# Patient Record
Sex: MALE | Race: BLACK OR AFRICAN AMERICAN | NOT HISPANIC OR LATINO | Employment: OTHER | URBAN - METROPOLITAN AREA
[De-identification: names, ages, dates, MRNs, and addresses within clinical notes are randomized per-mention and may not be internally consistent; named-entity substitution may affect disease eponyms.]

---

## 2017-01-01 ENCOUNTER — LAB VISIT (OUTPATIENT)
Dept: LAB | Facility: HOSPITAL | Age: 77
End: 2017-01-01
Payer: MEDICARE

## 2017-01-01 ENCOUNTER — HISTORICAL (OUTPATIENT)
Dept: LAB | Facility: HOSPITAL | Age: 77
End: 2017-01-01

## 2017-01-01 ENCOUNTER — LAB VISIT (OUTPATIENT)
Dept: LAB | Facility: HOSPITAL | Age: 77
End: 2017-01-01
Attending: INTERNAL MEDICINE
Payer: MEDICARE

## 2017-01-01 ENCOUNTER — HISTORICAL (OUTPATIENT)
Dept: ADMINISTRATIVE | Facility: HOSPITAL | Age: 77
End: 2017-01-01

## 2017-01-01 ENCOUNTER — DOCUMENTATION ONLY (OUTPATIENT)
Dept: TRANSPLANT | Facility: CLINIC | Age: 77
End: 2017-01-01

## 2017-01-01 ENCOUNTER — TELEPHONE (OUTPATIENT)
Dept: TRANSPLANT | Facility: CLINIC | Age: 77
End: 2017-01-01

## 2017-01-01 ENCOUNTER — OFFICE VISIT (OUTPATIENT)
Dept: TRANSPLANT | Facility: CLINIC | Age: 77
End: 2017-01-01
Payer: MEDICARE

## 2017-01-01 ENCOUNTER — HISTORICAL (OUTPATIENT)
Dept: RADIOLOGY | Facility: HOSPITAL | Age: 77
End: 2017-01-01

## 2017-01-01 VITALS
DIASTOLIC BLOOD PRESSURE: 66 MMHG | BODY MASS INDEX: 30.18 KG/M2 | SYSTOLIC BLOOD PRESSURE: 128 MMHG | OXYGEN SATURATION: 97 % | WEIGHT: 187.81 LBS | HEIGHT: 66 IN | RESPIRATION RATE: 16 BRPM | TEMPERATURE: 98 F | HEART RATE: 72 BPM

## 2017-01-01 DIAGNOSIS — Z76.82 ORGAN TRANSPLANT CANDIDATE: ICD-10-CM

## 2017-01-01 DIAGNOSIS — I10 ESSENTIAL HYPERTENSION: Chronic | ICD-10-CM

## 2017-01-01 DIAGNOSIS — K21.9 GASTROESOPHAGEAL REFLUX DISEASE, ESOPHAGITIS PRESENCE NOT SPECIFIED: ICD-10-CM

## 2017-01-01 DIAGNOSIS — Z76.82 ORGAN TRANSPLANT CANDIDATE: Primary | ICD-10-CM

## 2017-01-01 DIAGNOSIS — E11.22 CONTROLLED TYPE 2 DIABETES MELLITUS WITH CHRONIC KIDNEY DISEASE, WITHOUT LONG-TERM CURRENT USE OF INSULIN, UNSPECIFIED CKD STAGE: ICD-10-CM

## 2017-01-01 DIAGNOSIS — M10.9 GOUT, ARTHRITIS: ICD-10-CM

## 2017-01-01 DIAGNOSIS — G47.30 SLEEP APNEA, UNSPECIFIED TYPE: ICD-10-CM

## 2017-01-01 DIAGNOSIS — Z76.82 AWAITING ORGAN TRANSPLANT STATUS: ICD-10-CM

## 2017-01-01 DIAGNOSIS — C64.9 RENAL CELL CANCER, UNSPECIFIED LATERALITY: Chronic | ICD-10-CM

## 2017-01-01 DIAGNOSIS — Z85.528 HISTORY OF RENAL CELL CARCINOMA: Chronic | ICD-10-CM

## 2017-01-01 DIAGNOSIS — N25.81 SECONDARY HYPERPARATHYROIDISM, RENAL: ICD-10-CM

## 2017-01-01 DIAGNOSIS — N18.6 ESRD (END STAGE RENAL DISEASE): Primary | Chronic | ICD-10-CM

## 2017-01-01 LAB
ABS NEUT (OLG): 5.3 X10(3)/MCL (ref 2.1–9.2)
ALBUMIN SERPL-MCNC: 3.3 GM/DL (ref 3.4–5)
ALBUMIN/GLOB SERPL: 1 RATIO (ref 1.1–2)
ALP SERPL-CCNC: 130 UNIT/L (ref 46–116)
ALT SERPL-CCNC: 19 UNIT/L (ref 12–78)
AST SERPL-CCNC: 16 UNIT/L (ref 15–37)
BASOPHILS NFR BLD AUTO: 0 % (ref 0–2)
BILIRUB SERPL-MCNC: 0.6 MG/DL (ref 0.2–1)
BILIRUBIN DIRECT+TOT PNL SERPL-MCNC: 0.13 MG/DL (ref 0–0.2)
BILIRUBIN DIRECT+TOT PNL SERPL-MCNC: 0.47 MG/DL (ref 0–0.8)
BUN SERPL-MCNC: 29 MG/DL (ref 7–18)
CALCIUM SERPL-MCNC: 9.3 MG/DL (ref 8.5–10.1)
CHLORIDE SERPL-SCNC: 101 MMOL/L (ref 98–107)
CLASS I ANTIBODIES - LUMINEX: NEGATIVE
CLASS II ANTIBODIES - LUMINEX: NEGATIVE
CO2 SERPL-SCNC: 29.6 MMOL/L (ref 21–32)
CPRA %: 0
CREAT SERPL-MCNC: 6.42 MG/DL (ref 0.6–1.3)
EOSINOPHIL # BLD AUTO: 0.1 X10(3)/MCL
EOSINOPHIL NFR BLD AUTO: 1 %
ERYTHROCYTE [DISTWIDTH] IN BLOOD BY AUTOMATED COUNT: 13.8 % (ref 11.5–17)
ERYTHROCYTE [DISTWIDTH] IN BLOOD BY AUTOMATED COUNT: 14.1 % (ref 11.5–17)
GLOBULIN SER-MCNC: 3.2 GM/DL (ref 2.4–3.5)
GLUCOSE SERPL-MCNC: 243 MG/DL (ref 74–106)
HCT VFR BLD AUTO: 33.3 % (ref 42–52)
HCT VFR BLD AUTO: 33.6 % (ref 42–52)
HEP. B SURF AB, QUAL: POSITIVE
HEP. B SURF AB, QUANT.: 113 MIU/ML
HGB BLD-MCNC: 11.4 GM/DL (ref 14–18)
HGB BLD-MCNC: 11.5 GM/DL (ref 14–18)
HPRA INTERPRETATION: NORMAL
LYMPHOCYTES # BLD AUTO: 1.2 X10(3)/MCL
LYMPHOCYTES NFR BLD AUTO: 16 % (ref 13–40)
MAGNESIUM SERPL-MCNC: 1.9 MG/DL (ref 1.8–2.4)
MCH RBC QN AUTO: 36.2 PG (ref 27–31)
MCH RBC QN AUTO: 37.4 PG (ref 27–31)
MCHC RBC AUTO-ENTMCNC: 34.2 GM/DL (ref 33–36)
MCHC RBC AUTO-ENTMCNC: 34.3 GM/DL (ref 33–36)
MCV RBC AUTO: 106 FL (ref 80–94)
MCV RBC AUTO: 109.1 FL (ref 80–94)
MONOCYTES # BLD AUTO: 0.8 X10(3)/MCL
MONOCYTES NFR BLD AUTO: 11 % (ref 2–11)
NEUTROPHILS # BLD AUTO: 5.3 X10(3)/MCL (ref 2.1–9.2)
NEUTROPHILS NFR BLD AUTO: 72 % (ref 47–80)
PLATELET # BLD AUTO: 217 X10(3)/MCL (ref 130–400)
PLATELET # BLD AUTO: 244 X10(3)/MCL (ref 130–400)
PMV BLD AUTO: 7.6 FL (ref 7.4–10.4)
PMV BLD AUTO: 7.7 FL (ref 7.4–10.4)
POTASSIUM SERPL-SCNC: 3.3 MMOL/L (ref 3.5–5.1)
POTASSIUM SERPL-SCNC: 3.8 MMOL/L (ref 3.5–5.1)
PROT SERPL-MCNC: 6.5 GM/DL (ref 6.4–8.2)
RBC # BLD AUTO: 3.05 X10(6)/MCL (ref 4.7–6.1)
RBC # BLD AUTO: 3.17 X10(6)/MCL (ref 4.7–6.1)
SERUM COLLECTION DT - LUMINEX CLASS I: NORMAL
SERUM COLLECTION DT - LUMINEX CLASS II: NORMAL
SODIUM SERPL-SCNC: 140 MMOL/L (ref 136–145)
SPCL1 TESTING DATE: NORMAL
SPCL2 TESTING DATE: NORMAL
SPCLU TESTING DATE: NORMAL
WBC # SPEC AUTO: 4.8 X10(3)/MCL (ref 4.5–11.5)
WBC # SPEC AUTO: 7.4 X10(3)/MCL (ref 4.5–11.5)

## 2017-01-01 PROCEDURE — 86829 HLA CLASS I/II ANTIBODY QUAL: CPT | Mod: 91,PO,TXP

## 2017-01-01 PROCEDURE — 86829 HLA CLASS I/II ANTIBODY QUAL: CPT | Mod: PO,TXP

## 2017-01-01 PROCEDURE — 86832 HLA CLASS I HIGH DEFIN QUAL: CPT | Mod: PO,TXP

## 2017-01-01 PROCEDURE — 86829 HLA CLASS I/II ANTIBODY QUAL: CPT | Mod: PO

## 2017-01-01 PROCEDURE — 99999 PR PBB SHADOW E&M-EST. PATIENT-LVL III: CPT | Mod: PBBFAC,TXP,, | Performed by: INTERNAL MEDICINE

## 2017-01-01 PROCEDURE — 86833 HLA CLASS II HIGH DEFIN QUAL: CPT | Mod: PO,TXP

## 2017-01-01 PROCEDURE — 86829 HLA CLASS I/II ANTIBODY QUAL: CPT | Mod: 91,PO

## 2017-01-01 PROCEDURE — 99213 OFFICE O/P EST LOW 20 MIN: CPT | Mod: PBBFAC,TXP | Performed by: INTERNAL MEDICINE

## 2017-01-01 PROCEDURE — 99215 OFFICE O/P EST HI 40 MIN: CPT | Mod: S$PBB,TXP,, | Performed by: INTERNAL MEDICINE

## 2017-01-11 ENCOUNTER — TELEPHONE (OUTPATIENT)
Dept: RADIOLOGY | Facility: HOSPITAL | Age: 77
End: 2017-01-11

## 2017-01-12 ENCOUNTER — HOSPITAL ENCOUNTER (OUTPATIENT)
Dept: RADIOLOGY | Facility: HOSPITAL | Age: 77
Discharge: HOME | End: 2017-01-12
Attending: TRANSPLANT SURGERY
Payer: MEDICARE

## 2017-01-12 DIAGNOSIS — Z76.82 AWAITING ORGAN TRANSPLANT STATUS: ICD-10-CM

## 2017-01-12 PROCEDURE — 74176 CT ABD & PELVIS W/O CONTRAST: CPT | Mod: TC,PO

## 2017-01-12 PROCEDURE — 74176 CT ABD & PELVIS W/O CONTRAST: CPT | Mod: 26,,, | Performed by: RADIOLOGY

## 2017-01-13 ENCOUNTER — LAB VISIT (OUTPATIENT)
Dept: LAB | Facility: HOSPITAL | Age: 77
End: 2017-01-13
Payer: MEDICARE

## 2017-01-13 DIAGNOSIS — Z76.82 ORGAN TRANSPLANT CANDIDATE: ICD-10-CM

## 2017-01-13 PROCEDURE — 86829 HLA CLASS I/II ANTIBODY QUAL: CPT | Mod: PO

## 2017-01-13 PROCEDURE — 86829 HLA CLASS I/II ANTIBODY QUAL: CPT | Mod: 91,PO

## 2017-01-20 ENCOUNTER — HISTORICAL (OUTPATIENT)
Dept: CARDIOLOGY | Facility: HOSPITAL | Age: 77
End: 2017-01-20

## 2017-02-18 NOTE — NURSING
ON-CALL NOTE    UNOS# [id number]    Notified by Khanh Correa, , that Edilson Black on list for cadaveric kidney.  Spoke with patient and identified no acute medical issues in telephone assessment.  Current sample of blood is available for crossmatch.  Patient reports no sensitizing event since last blood sample for PRA received.    Notified by Khanh Correa that virtual crossmatch is negative.  Patient notified of test results and verbalized understanding. Pt notified that transplant organ is PHS High Risk due to prison Time, pt accepts this Back-up offer. Donor OR time scheduled for 9:00am.  Pt on stand-by for further instructions.    Late entry:  2/18/17 at 1:15pm:  Received notification from Gee Correa that primary recipient will receive the kidney, release the back-up.  Spoke to pt's wife and released pt from stand-by.

## 2017-03-03 NOTE — PROGRESS NOTES
YEARLY LIST MANAGEMENT NOTE    Edilson Black's kidney transplant listing status reviewed.  Patient is due for follow-up appointments in April 2017.  Appointments will be scheduled per protocol.  HLA sample is current and being rec'd on a regular basis.

## 2017-04-25 NOTE — PROGRESS NOTES
Patient was seen in listed [facility] transplant clinic for continued evaluation for kidney, kidney/pancreas or pancreas only transplant. The patient attended a group education session that discussed/reviewed the following aspects of transplantation: evaluation and selection committee process, UNOS waitlist management/multiple listings, types of organs offered (KDPI < 85%, KDPI > 85%, PHS increased risk, DCD), financial aspects, surgical procedures, dietary instruction pre- and post-transplant, health maintenance pre- and post-transplant, post-transplant hospitalization and outpatient follow-up, potential to participate in a research protocol, and medication management and side effects. A question and answer session was provided after the presentation.     The patient was seen by all members of the multi-disciplinary team to include: Nephrologist/PA, , Transplant Coordinator, and .      The patient reviewed and signed all consents for evaluation which were witnessed and sent to scanning into the EPIC chart.     The patient was given an education book and plan for further evaluation based on her individual assessment.      The patient was encouraged to call with any questions or concerns.

## 2017-04-25 NOTE — MR AVS SNAPSHOT
[facility]  [address]  [facility] [address]  Phone: [phone]                  Edilson Black   2017 12:30 PM   Office Visit    Description:  Male : 1940   Provider:  KIDNEY LISTED, TRANSPLANT   Department:  [facility]           Reason for Visit     Chronic Kidney Disease     Waitlist Status Update           Diagnoses this Visit        Comments    ESRD (end stage renal disease)    -  Primary     Controlled type 2 diabetes mellitus with chronic kidney disease, without long-term current use of insulin, unspecified CKD stage         History of renal cell carcinoma         Gastroesophageal reflux disease, esophagitis presence not specified         Gout, arthritis         Essential hypertension         Renal cell cancer, unspecified laterality         Secondary hyperparathyroidism, renal         Sleep apnea, unspecified type         Awaiting organ transplant status                To Do List           Goals (5 Years of Data)     None      [facility]     [facility] -  Assistance  Unless otherwise directed by your provider, please contact [facility], our nurse care line that is available for  assistance.     Registered nurses in the [facility] Center provide: appointment scheduling, clinical advisement, health education, and other advisory services.  Call: [phone] (toll free)               Medications           Message regarding Medications     Verify the changes and/or additions to your medication regime listed below are the same as discussed with your clinician today.  If any of these changes or additions are incorrect, please notify your healthcare provider.        STOP taking these medications     aspirin (ECOTRIN) 81 MG EC tablet Take 81 mg by mouth once daily.           Verify that the below list of medications is an accurate representation of the medications you are currently taking.  If none reported, the list  "may be blank. If incorrect, please contact your healthcare provider. Carry this list with you in case of emergency.           Current Medications     eszopiclone (LUNESTA) 3 mg Tab Take 3 mg by mouth every evening.    febuxostat (ULORIC) 40 mg Tab Take 40 mg by mouth once daily.    fish oil-omega-3 fatty acids 300-1,000 mg capsule Take 1 g by mouth once daily.    pantoprazole (PROTONIX) 40 MG tablet Take 40 mg by mouth once daily.    pioglitazone (ACTOS) 30 MG tablet Take 30 mg by mouth once daily.    prednisoLONE acetate (PRED FORTE) 1 % DrpS 1 drop 4 (four) times daily.    sevelamer carbonate (RENVELA) 800 mg Tab Take 2,400 mg by mouth 3 (three) times daily with meals. And 1 tablet twice daily with snacks    valacyclovir (VALTREX) 500 MG tablet Take 500 mg by mouth once daily.    VITAMIN B COMP W-C/FA/ZINC (DIALYVITE 800 ORAL) Take 1 tablet by mouth once daily.           Clinical Reference Information           Your Vitals Were     BP Pulse Temp Resp Height Weight    128/66 (BP Location: Right arm, Patient Position: Sitting, BP Method: Automatic) 72 98.3 °F (36.8 °C) (Oral) 16 5' 6" (1.676 m) 85.2 kg (187 lb 13.3 oz)    SpO2 BMI             97% 30.32 kg/m2         Blood Pressure          Most Recent Value    BP  128/66      Allergies as of 4/25/2017     Iodine And Iodide Containing Products      Immunizations Administered on Date of Encounter - 4/25/2017     None      Maintenance Dialysis History     Start End Type Comments Center    10/26/2012  [place] [phone] [facility]            Current Dialysis Center Information     [facility] [address] Phone #:  [phone]    Contact:  N/A STEFANY BONE  [address] Fax #:  [phone]            Transplant Information        Txp Date Organ Coordinator Care Team     Kidney Avelino Keane Jr., RN Referring Physician:  Perez Ritchie MD   Current Nephrologist:  Perez Ritchie MD         [facility] Sign-Up     Activating your " [facility] account is as easy as 1-2-3!     1) Visit [email or web address], select Sign Up Now, enter this activation code and your date of birth, then select Next.  [email or web address]  Expires: 6/9/2017  4:14 PM      2) Create a username and password to use when you visit [facility] in the future and select a security question in case you lose your password and select Next.    3) Enter your e-mail address and click Sign Up!    Additional Information  If you have questions, please e-mail [email or web address] or call [phone] to talk to our [facility] staff. Remember, [facility] is NOT to be used for urgent needs. For medical emergencies, dial 911.         Instructions    1. Keep staying active   2. If you have any potential living donors have tem contact the living donor coordinator        Language Assistance Services     ATTENTION: Language assistance services are available, free of charge. Please call [phone].      ATENCIÓN: Si habla español, tiene a silva disposición servicios gratuitos de asistencia lingüística. Llame al [phone].     TEE Ý: N?u b?n nói Ti?ng Vi?t, có các d?ch v? h? tr? ngôn ng? mi?n phí dành cho b?n. G?i s? [phone].         [facility] complies with applicable Federal civil rights laws and does not discriminate on the basis of race, color, national origin, age, disability, or sex.

## 2017-04-25 NOTE — PATIENT INSTRUCTIONS
1. Keep staying active   2. If you have any potential living donors have tem contact the living donor coordinator

## 2017-04-25 NOTE — PROGRESS NOTES
Kidney Transplant Recipient Reevalulation    Referring Physician: Perez Ritchie  Current Nephrologist: Perez Ritchie  Waitlist Status: active  Dialysis Start Date: 10/26/2012    Subjective:     CC:  Six month reassessment of kidney transplant candidacy.    HPI:  Mr. Black is a 76 y.o. year old Black or  male with ESRD secondary to diabetic nephropathy and HTN.  He has been on the wait list for a kidney transplant at [facility] since 10/26/2012. Patient is currently on hemodialysis started on 10/26/12. Patient is dialyzing on MWF schedule.  Patient reports that he is tolerating dialysis well.. He has a LUE AV fistula. He required balloon angioplasty 1-2 times in 2016. He was last seen in renal transplant re-eval clinic on 11/22/16. Patient denies any recent hospitalizations or ED visits.    He states he doesn't exercise but will do a lot of walking - 2-3 hours a day. 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 He denies any chest pain, DEVI, or claudication symptoms. He doesn't use any assistive devices. Previously got a cane due to having falls related to hypotension but after he stopped taking his BP meds he didn't use it. Wife states that he has had 2 falls but they were a while ago. Patient was able to walk with this writer from clinic to the 3rd floor using the stairwell closest to the renal transplant clinic and back to clinic exam room without any DEVI, chest pain, or claudication.    Review of Systems   Constitutional: Denies fever/chills, fatigue; night sweats  EENT: +wears eyeglasses; s/p bilateral cataract removal in 2016; Denies hearing problems, trouble swallowing.   Respiratory: +RUBIA and wears CPAP every night; Denies shortness of breath, dyspnea on exertion, orthopnea, wheezing, hemoptysis, denies known TB exposure or history of positive TB skin test  Cardiovascular: Denies chest pain, palpitations, history of MI, history of stroke, history of  "DVT  Gastrointestinal: +2 inches of his colon removed for ?"stones" at [facility] in 2014; +hernia repair in Dec 2015 from [facility] with mesh placement; Denies abdominal pain, nausea/vomiting/diarrhea/constipation. Denies history of GI bleeding or ulcers.   Genitourinary: no residual UOP; +right RCC s/p partial nephrectomy in 2007; +nephrolithiasis; Denies history of recurrent UTI's, history of urinary obstruction, hematuria  Musculoskeletal: Denies trouble moving extremities, denies joint pain or swelling  Skin: Denies history of skin cancer, denies rash, ulcerations  Heme/onc: +right RCC s/p partial nephrectomy in 2007; +?easily bruising  Endocrine: Denies thyroid disease, unintentional weight loss/weight gain  Neurological: +peripheral neuropathy with left hand steal symptoms; Denies tremors, seizures, dizziness  Psychiatric: Denies anxiety, depression. Denies hallucinations or delusions.    Medications:   Current Outpatient Prescriptions   Medication Sig Dispense Refill    eszopiclone (LUNESTA) 3 mg Tab Take 3 mg by mouth every evening.      febuxostat (ULORIC) 40 mg Tab Take 40 mg by mouth once daily.      fish oil-omega-3 fatty acids 300-1,000 mg capsule Take 1 g by mouth once daily.      pantoprazole (PROTONIX) 40 MG tablet Take 40 mg by mouth once daily.      pioglitazone (ACTOS) 30 MG tablet Take 30 mg by mouth once daily.      prednisoLONE acetate (PRED FORTE) 1 % DrpS 1 drop 4 (four) times daily.      sevelamer carbonate (RENVELA) 800 mg Tab Take 2,400 mg by mouth 3 (three) times daily with meals. And 1 tablet twice daily with snacks      valacyclovir (VALTREX) 500 MG tablet Take 500 mg by mouth once daily.      VITAMIN B COMP W-C/FA/ZINC (DIALYVITE 800 ORAL) Take 1 tablet by mouth once daily.       No current facility-administered medications for this visit.        Objective:   body mass index is 30.32 kg/(m^2).   /66 (BP Location: Right arm, Patient Position: Sitting, BP " "Method: Automatic)  Pulse 72  Temp 98.3 °F (36.8 °C) (Oral)   Resp 16  Ht 5' 6" (1.676 m)  Wt 85.2 kg (187 lb 13.3 oz)  SpO2 97%  BMI 30.32 kg/m2      Physical Exam  General: No acute distress, well groomed, alert and oriented x 3  HEENT: Normocephalic, atraumatic, PERRLA, sclera anicteric, conjunctiva/corneas clear, EOM's intact bilaterally, external inspection of ears and nose normal, moist mucous membranes, no oral ulcerations/lesions; upper and lower dentures   Neck: Supple, symmetrical, trachea midline, no masses, no carotid bruits, no JVD, thyroid is normal without nodules or enlargement   Respiratory: Clear to auscultation bilaterally, respirations unlabored, no rales/rhonchi/wheezing   Cardiovacular: Regular rate and rhythm, S1, S2 normal, no murmurs, no rubs or gallops   Gastrointestinal: Soft, non-tender, bowel sounds normal, no masses, no palpable organomegaly; right well healed scar from prior partial nephrectomy; well healed midline incision from hernia repair  Extremities: No clubbing or cyanosis of upper extremities bilaterally, no pedal edema bilaterally; +2 bilateral radial pulses; +tophus on right knee  Skin: warm and dry; no rash on exposed skin  Lymph nodes: Cervical and supraclavicular nodes normal   Neurologic: No focal neurologic deficits.   Musculoskeletal: moves all extremities without difficulty, FROM, 4+/5 strength, ambulates without an assistive device  Psychiatric: Normal mood and affect. Responds appropriately to questions.      Labs:  Lab Results   Component Value Date    WBC 4.70 11/07/2013    WBC 4.70 11/07/2013    HGB 10.7 (L) 11/07/2013    HGB 10.7 (L) 11/07/2013    HCT 32.8 (L) 11/07/2013    HCT 32.8 (L) 11/07/2013     11/07/2013     11/07/2013    K 4.3 11/07/2013    K 4.3 11/07/2013     11/07/2013     11/07/2013    CO2 31 (H) 11/07/2013    CO2 31 (H) 11/07/2013    BUN 36 (H) 11/07/2013    BUN 36 (H) 11/07/2013    CREATININE 8.1 (H) 11/07/2013    " CREATININE 8.1 (H) 11/07/2013    EGFRNONAA 6.1 (A) 11/07/2013    EGFRNONAA 6.1 (A) 11/07/2013    CALCIUM 9.7 11/07/2013    CALCIUM 9.7 11/07/2013    ALBUMIN 3.8 11/07/2013    ALBUMIN 3.8 11/07/2013    AST 16 11/07/2013    AST 16 11/07/2013    ALT 30 11/07/2013    ALT 30 11/07/2013    .0 (H) 11/21/2013       Lab Results   Component Value Date    CPRA 0 10/12/2016    AJ1RMVE Negative 10/12/2016    CIIAB Negative 10/12/2016       Labs were reviewed with the patient.    Pre-transplant Workup:   Reviewed with the patient.    Assessment:     1. ESRD 2/2 HTN on HD since 12/2012    2. Controlled type 2 diabetes mellitus with chronic kidney disease, without long-term current use of insulin, unspecified CKD stage    3. History of renal cell carcinoma    4. Gastroesophageal reflux disease, esophagitis presence not specified    5. Gout, arthritis    6. Essential hypertension    7. Renal cell cancer, unspecified laterality    8. Secondary hyperparathyroidism, renal    9. Sleep apnea, unspecified type    10. Awaiting organ transplant status        Plan:     Transplant Candidacy:   Mr. Black is a suitable but higher risk kidney transplant candidate given advanced age but he was able to walk with this writer as mentioned above.  He remains in overall stable health, and will remain active on the transplant list.    Please contact patient's PCP/community nephrologist/ID specialist to determine why he is taking Valtrex - patient unable to recall which doctor prescribed.     Exercise: reminded Edilson of the importance of regular exercise for weight management, blood sugar and blood pressure management.  I also explained exercise has been shown to improve cardiovascular health, energy level, and sleep hygiene.  Lastly, I advised him that cardiovascular complications are leading cause of death for renal transplant recipients, and regular exercise can help lower this risk.      Gabi Mendiola MD       Follow-up:   In addition to  the tests noted in the plan, Mr. Black will continue to have reevaluation as per the standing pre-kidney transplant protocol:  1. Monthly blood for PRA  2. Annual return to clinic, except HIV positive, > 65 years of age, or pancreas transplant candidates who will be scheduled to see transplant every 6 months while in pre-transplant phase  3. Annual re-testing: CXR, EKG, yearly mammograms for women over 40 and PSA for males over 40, cardiology follow-up as recommended by initial cardiology pre-transplant evaluation  4. Renal ultrasound every 2 years  5. Baseline colonoscopy after age 50 and repeated as recommended    UNOS Patient Status  Functional Status: 70% - Cares for self: unable to carry on normal activity or active work  Physical Capacity: No Limitations

## 2017-05-29 NOTE — PROGRESS NOTES
Transplant Recipient Adult Psychosocial Assessment  *This is an update assessment with the previous assessment completed on 16; pt is listed for kidney txp since 2014 (UNOS qualified: 10/26/2012).    Edilson Black  [address] [facility] [address]    Telephone Information:   Mobile [phone]   Home  [phone] (home)  Work  There is no work phone number on file.  E-mail  No e-mail address on record    Sex: male  YOB: 1940  Age: 76 y.o.    Encounter Date: 2017  U[place] Citizen: yes  Primary Language: English   Needed: no    Emergency Contact:  Name: Marquez Black  Relationship: spouse  Address: [address] [place] [address]  Phone Numbers:  [phone] (home), [phone] (mobile)    Family/Social Support:   Number of dependents/: denied  Marital history: Pt reports being  twice with pt's first marriage ending in divorce. Pt reports being  to pt's current wife, Marquez,  for the past 29 years.  Other family dynamics:   Pt reports 5 adult children(Irma Black, lives in [place], [place]; Deion Black; Sergio Julio; Vincent Julio; EDSON Arnold) that are supportive. Pt reports having 8 grandchildren. Pt reports siblings and parents are .    Household Composition:  Name: Sherin Black  Age: 75  Relationship: spouse  Does person drive? yes    Do you and your caregivers have access to reliable transportation? yes  PRIMARY CAREGIVER: Sherin Black, pt's wife ([phone]) and EDSON Hugheslaura, pt's son, ([phone]).      provided in-depth information to patient and caregiver regarding pre- and post-transplant caregiver role.   strongly encourages patient and caregiver to have concrete plan regarding post-transplant care giving, including back-up caregiver(s) to ensure care giving needs are met as needed.    Patient and Caregiver states understanding all aspects of caregiver  role/commitment and is able/willing/committed to being caregiver to the fullest extent necessary.    Patient and Caregiver verbalizes understanding of the education provided today and caregiver responsibilities.         remains available. Patient and Caregiver agree to contact  in a timely manner if concerns arise.      Able to take time off work without financial concerns: yes.     Additional Significant Others who will Assist with Transplant:  Name: Joshua Arnold - most likely will be the secondary  Age: 48  Phone: [phone]  City: [place] State: [place]  Relationship: son  Does person drive? yes    Living Will: no  Healthcare Power of : no  Advance Directives on file: <<no information> per medical record.  Verbally reviewed LW/HCPA information.   provided patient with copy of LW/HCPA documents and provided education on completion of forms.    Living Donors: No. Education and resource information given to patient.    Highest Education Level: Grade School (0-8) (7th Grade)  Reading Ability: 5th grade  Reports difficulty with: reading and seeing. Pt does wears glasses. Pt reports pt's wife  does not have issues with reading and handles all finances.   Learns Best By:  Verbal explanation/Demonstration/ Hands on practice     Status: no  VA Benefits: no     Working for Income: yes  If yes, working activity level: Working Part Time Due to Patient Choice  Patient continues to be self employed through a [employer] service [employer].  Pt reports working with another heidi for 3-4 hours a day/5 days a week.     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    Disabled: no    Monthly Income:  Salary/Wages: $Some Wages, but Varies  SS Reitrement: $730  Other household members total: $167 (wife's income)    Able to afford all costs now and if transplanted, including medications: yes. Pt reports  Sister-in-law: Laura Barron, step-son: Michael Julio, and son: iMki Franco. can assist  Patient and Caregiver verbalizes understanding of personal responsibilities related to transplant costs and the importance of having a financial plan to ensure that patients transplant costs are fully covered.       provided fundraising information/education. Patientverbalizes understanding.   remains available.    Insurance:   Payor/Plan Subscr  Sex Relation Sub. Ins. ID Effective Group Num   1. MEDICARE - [place]* MIKI WHITEHEAD 1940 Male  [id number]                                    [address]   2. [employer] -* CRISTEL WHITEHEAD 1941 Female  [id number] 13 [address] [place] [address]                                   [address] [address]     Primary Insurance (for UNOS reporting): Public Insurance - Medicare FFS (Fee For Service) - based on pt's age  Secondary Insurance (for UNOS reporting): Private Insurance Pt reports supplement is being paid for by the [facility]. SW provided education and information regarding this policy post transplant. Patient and Caregiver verbalized understanding and agreement.   Patient and Caregiver verbalizes clear understanding that patient may experience difficulty obtaining and/or be denied insurance coverage post-surgery. This includes and is not limited to disability insurance, life insurance, health insurance, burial insurance, long term care insurance, and other insurances.      Patient and Caregiver also reports understanding that future health concerns related to or unrelated to transplantation may not be covered by patient's insurance.  Resources and information provided and reviewed.     Patient and Caregiver provides verbal permission to release any necessary information to outside resources for patient care and discharge planning.  Resources and information provided are reviewed.      Dialysis Adherence:  Patient reports being on hemodialysis in center attending all  dialysis appointments and staying for the entire course of treatment. Stephanie RN at pt's dialysis center reports over last three months that the patient has had no AMAs, not had any no-shows, and reports that pt has not had any issues with labs, fluid gains, or other psychosocial issues..      Infusion Service: patient utilizing? no  Home Health: patient utilizing? no  DME: yes ; BP cuff and c-pap  Pulmonary/Cardiac Rehab: Pt reports no history  ADLS:  Pt reports pt is independent with all ADLS including driving, bathing, walking, taking medications, cooking, housekeeping, eating, and shopping.    Per History Section:  Past Medical History:   Diagnosis Date    Benign hypertension with ESRD (end-stage renal disease) 11/7/2013    Diabetes mellitus type 2, controlled - age 50s 11/7/2013    Diabetic neuropathy 11/7/2013    ESRD (end stage renal disease) 12/2012 11/7/2013    GERD (gastroesophageal reflux disease) 11/7/2013    Gout, arthritis 11/7/2013    Hypertension 1/8/2015    Renal cell cancer in 2007 or 2008 S/P partial nephrectomy  11/7/2013    Secondary hyperparathyroidism, renal 11/7/2013    Sleep apnea - on CPAP 11/7/2013     Social History     Social History    Marital status:      Spouse name: N/A    Number of children: N/A    Years of education: N/A     Occupational History    Not on file.     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  Substance abstinence/cessation counseling, education and resources provided and reviewed.     Arrests/DWI/Treatment/Rehab: patient denies    Psychiatric History:    Mental Health: Patient denies having current and/or past depression, emotional concerns, mental health diagnosis and anxiety at this time.  Psychiatrist/Counselor: Patient denies patient is seeking mental health counseling at this time.   provided mental health resources, and patient verbalizes understanding. Patient states patient is willing to seek professional assistance for mental health as needed/recommended.    Medications:  Patient denies patient is taking medication(s) for mental health issues at this time.      Suicide/Homicide Issues: Pt denies any current and/or past SI/HI at this time.  Safety at home: Pt denies any current and/or past DV concerns at this time.  Pt reports feeling safe in pt's home environment.      Knowledge: Patient and Caregiver states having clear understanding and realistic expectations regarding the potential risks and potential benefits of organ transplantation and organ donation and agrees to discuss with health care team members and support system members, as well as to utilize available resources and express questions and/or concerns in order to further facilitate the pt informed decision-making.  Resources and information provided and reviewed.    Patient and Caregiver is aware of [facility]'s affiliation and/or partnership with agencies in home health care, LTAC, SNF, [facility], and other hospitals and clinics.    Understanding: Patient and Caregiver reports having a clear understanding of the many lifetime commitments involved with being a transplant recipient, including costs, compliance, medications, lab work, procedures, appointments, concrete and financial planning, preparedness, timely and appropriate communication of concerns, abstinence (ETOH, tobacco, illicit  non-prescribed drugs), adherence to all health care team recommendations, support system and caregiver involvement, appropriate and timely resource utilization and follow-through, mental health counseling as needed/recommended, and patient and caregiver responsibilities.  Social Service Handbook, resources and detailed educational information provided and reviewed.  Educational information provided.    Patient and Caregiver also reports current and expected compliance with health care regime and states having a clear understanding of the importance of compliance.      Patient and Caregiver reports a clear understanding that risks and benefits may be involved with organ transplantation and with organ donation.       Patient and Caregiver also reports clear understanding that psychosocial risk factors may affect patient, and include but are not limited to feelings of depression, generalized anxiety, anxiety regarding dependence on others, post traumatic stress disorder, feelings of guilt and other emotional and/or mental concerns, and/or exacerbation of existing mental health concerns.  Detailed resources provided and discussed.      Patient and Caregiver agrees to access appropriate resources in a timely manner as needed and/or as recommended, and to communicate concerns appropriately.  Patient and Caregiver also reports a clear understanding of treatment options available.      reviewed education, provided additional information, and answered questions.    Feelings or Concerns: Pt denies any issues or concerns at this time. SW contact information provided. SW to remain available should any arise.     Coping: Pt reports coping well with the transplant process at this time and reports dancing Zydeco as a ways to cope. Pt reports [facility] home as [facility] in [place], [place] with Linn Syed presiding.     Goals: To improve quality of life by discontinuing dialysis through transplant, traveling,  "visiting his son, taking a cruise, and [place]. SW provided support and education. Pt verbalizes understanding that transplant is not a guarantee of ending dialysis and life after transplantation will be a "new normal" post transplant.  SW remains available.    Interview Behavior: Patient presents as alert and oriented x 4, pleasant, good eye contact, recall good, concentration/judgement good, average intelligence and asking and answering questions appropriately. Pt provided permission for pt's son to remain in room during entire interview.        Transplant Social Work - Candidacy  Assessment/Plan:     Psychosocial Suitability: Patient presents as a suitable candidate for kidney transplant at this time. Based on psychosocial risk factors, patient presents as low risk, due to pt's stable financial plan, pt's good adherence, pt coping appropriately with the work-up process, and pt's confirmed caregiver plan.    Recommendations/Additional Comments: SW recommends that pt conduct fundraising to assist pt with pay for cost of medications, food, gas, and other transplant related needs. SW recommends that pt remain aware of potential mental health concerns and contact the team if any concerns arise. SW recommends that pt remain abstinent from tobacco, ETOH, and drug use. SW supports pt's continued dialysis adherence. SW remains available to answer any questions or concerns that arise as the pt moves through the transplant process.       Gayle Fleming LCSW       "

## 2017-07-11 NOTE — ADDENDUM NOTE
Encounter addended by: Marleni George on: 7/11/2017  3:25 PM<BR>    Actions taken: Letter status changed

## 2017-07-11 NOTE — LETTER
IMPORTANT      July 11, 2017    Edilson Black  [address] [facility] [address]     Re: [id number]    Dear Mr/Mrs Black,    Thank you for choosing [facility] as your health care provider.  We are committed to assisting you with timely insurance filing and payment of your account.  To protect your liability, updated insurance information must be given to us at the time of service and we should be notified immediately if      · Your insurance benefits/plan changes.   You become eligible for any other benefits   Your current plan/coverage terms.    Also, please bring in a copy of your insurance premium payment if you have one of the following types of insurance:    · Coverage from a MCC plan.  · Coverage from the Affordable Healthcare Act Plan.  · Coverage from a COBRA plan  · Premium paid by the National Kidney Foundation.    To ensure we have the correct insurance (Medical/Pharmacy) on file and to answer any questions regarding your benefits, please call us at [phone] or [phone] and ask to speak to the kidney  indicated below:      Transplant Dept  Dayne Hernandez   Heart   Kamilah Kennymimanjeet   Kidney (A-K) and Lung  Blakedelvin Barrientosjolanta    Kidney (L-Z)  Marta Alan   Liver    We look forward to hearing from you soon.    Sincerely,      Transplant Financial Services

## 2017-07-11 NOTE — Clinical Note
July 11, 2017        MIKI Black  [address] [facility] [address]         Dear MIKI Black:    As part of our commitment to share important information with our transplant patients, we want to provide you with the most current kidney and kidney/pancreas transplant outcomes at the [facility] as published bi-annually by the Scientific Registry for Organ Recipients (SRTR).    For kidney transplants performed between 1/1/2014 and 6/30/2016 the 1-year patient and graft survival rates are as follows:   Kidney-Cadaveric Donor Kidney-Living Donor Kidney/Pancreas   Adults [facility] 1-Year Expected 1-Year National 1-Year [facility] 1-Year Expected 1-Year National 1-Year [facility] 1-Year Expected 1-Year National 1-Year   Graft Survival 93.09% 94.31% 93.92% 97.83% 97.54% 97.75% 98.18% 96.73% 96.15%   Patient Survival 96.56% 96.91% 96.57% 100% 98.88% 98.83% 98.18% 97.52% 97.54%         To learn more about transplant outcomes in the [place] you can go to www.srtr.org.     Please call the Kidney Transplant Office at [phone] or [phone] should you have any questions or if:     Your insurance changes in any way   Your address or phone number changes   There are changes in your health   You are in the hospital or Emergency Room for any reason      Sincerely,  Kidney Transplant Team

## 2017-08-30 NOTE — PROGRESS NOTES
YEARLY LIST MANAGEMENT NOTE    Edilson Wayne's kidney transplant listing status reviewed.  Patient is due for follow-up appointments in October 2017.  Appointments will be scheduled per protocol.  HLA sample is current and being rec'd on a regular basis.

## 2017-11-09 NOTE — TELEPHONE ENCOUNTER
ON-CALL NOTE    UNOS# [id number]    Notified by Khanh Correa, , that Edilson Black on list for cadaveric kidney.  Spoke with patient and identified no acute medical issues in telephone assessment.  Current sample of blood is available for crossmatch.  Patient reports no sensitizing event since last blood sample for PRA received.  PHS high risk due active IV drug use and recent snf time within the last year.  PHS risk script read to patient and accepted offer.    Informed patient that he is backup and will be contacted with further information.  All questions answered and verbalized understanding.

## 2017-11-09 NOTE — TELEPHONE ENCOUNTER
Called patient to give updates on potential kidney offer.  Notified patient/wife to be NPO after noon today and will update him when we have more information.  All questions answered and patient verbalized understanding.

## 2018-02-09 ENCOUNTER — POST MORTEM DOCUMENTATION (OUTPATIENT)
Dept: TRANSPLANT | Facility: CLINIC | Age: 78
End: 2018-02-09

## 2018-02-09 NOTE — PROGRESS NOTES
"    Edilson Albarran "Babar" Wayne Sr.   89 -  Obituary  [facility]  [facility] will be held Saturday, February 10, 2018 at 1:00 p.m. at [facility] for Edilson Black, Sr., 77, who departed this life on 2018 at his home in [place], [place].  Services will be conducted by Rev. Fr. Laurent Ordonez.  Interment will be in [facility] in [place], [place].  Edilson Black, affectionately known as "Babar", was born on 1940 in [place], [place] to the late Deion and Rozina Elaire.   He is survived by his wife, Sherin Black; sons, Deion (Lizy) Wayne of [place], [place], Edilson Black Jr. of [place], [place], Cedrick Black of [place], [place], Dylon Julio (Sudarshan) of [place], [place], Marcos "Sergio" Sumanth of [place], [place] and NAVJOT Marin (Micheline) of [place], [place]; seven grandchildren and one great grandchild.  He was preceded in death by his parents, Deion and Rozina Elaire, Jr.; his sister, Fiorella Hernandez and his brothers, Ward and Rivas Black.  Visitation will be held Saturday, February 10, 2018 at [facility] from 9:00 a.m. until time of service.  Arrangements have been entrusted to [facility] [employer] of [place], [place].  Published in the The  on 2018  "